# Patient Record
Sex: FEMALE | Race: WHITE | NOT HISPANIC OR LATINO | URBAN - METROPOLITAN AREA
[De-identification: names, ages, dates, MRNs, and addresses within clinical notes are randomized per-mention and may not be internally consistent; named-entity substitution may affect disease eponyms.]

---

## 2022-01-12 ENCOUNTER — EMERGENCY (EMERGENCY)
Facility: HOSPITAL | Age: 27
LOS: 1 days | Discharge: HOME | End: 2022-01-12
Attending: STUDENT IN AN ORGANIZED HEALTH CARE EDUCATION/TRAINING PROGRAM
Payer: COMMERCIAL

## 2022-01-12 VITALS
RESPIRATION RATE: 18 BRPM | WEIGHT: 139.99 LBS | DIASTOLIC BLOOD PRESSURE: 77 MMHG | SYSTOLIC BLOOD PRESSURE: 123 MMHG | TEMPERATURE: 99 F | HEART RATE: 95 BPM | HEIGHT: 64 IN | OXYGEN SATURATION: 96 %

## 2022-01-12 LAB
ALBUMIN SERPL ELPH-MCNC: 3.3 G/DL — LOW (ref 3.5–5)
ALP SERPL-CCNC: 76 U/L — SIGNIFICANT CHANGE UP (ref 40–120)
ALT FLD-CCNC: 85 U/L DA — HIGH (ref 10–60)
ANION GAP SERPL CALC-SCNC: 6 MMOL/L — SIGNIFICANT CHANGE UP (ref 5–17)
ANISOCYTOSIS BLD QL: SLIGHT — SIGNIFICANT CHANGE UP
AST SERPL-CCNC: 24 U/L — SIGNIFICANT CHANGE UP (ref 10–40)
BASOPHILS # BLD AUTO: 0.07 K/UL — SIGNIFICANT CHANGE UP (ref 0–0.2)
BASOPHILS NFR BLD AUTO: 1 % — SIGNIFICANT CHANGE UP (ref 0–2)
BILIRUB SERPL-MCNC: 0.2 MG/DL — SIGNIFICANT CHANGE UP (ref 0.2–1.2)
BUN SERPL-MCNC: 9 MG/DL — SIGNIFICANT CHANGE UP (ref 7–18)
CALCIUM SERPL-MCNC: 8.9 MG/DL — SIGNIFICANT CHANGE UP (ref 8.4–10.5)
CHLORIDE SERPL-SCNC: 105 MMOL/L — SIGNIFICANT CHANGE UP (ref 96–108)
CO2 SERPL-SCNC: 30 MMOL/L — SIGNIFICANT CHANGE UP (ref 22–31)
CREAT SERPL-MCNC: 0.8 MG/DL — SIGNIFICANT CHANGE UP (ref 0.5–1.3)
EOSINOPHIL # BLD AUTO: 0.14 K/UL — SIGNIFICANT CHANGE UP (ref 0–0.5)
EOSINOPHIL NFR BLD AUTO: 2 % — SIGNIFICANT CHANGE UP (ref 0–6)
GLUCOSE SERPL-MCNC: 108 MG/DL — HIGH (ref 70–99)
HCG SERPL-ACNC: <1 MIU/ML — SIGNIFICANT CHANGE UP
HCT VFR BLD CALC: 35.5 % — SIGNIFICANT CHANGE UP (ref 34.5–45)
HGB BLD-MCNC: 11.7 G/DL — SIGNIFICANT CHANGE UP (ref 11.5–15.5)
HYPOCHROMIA BLD QL: SLIGHT — SIGNIFICANT CHANGE UP
LG PLATELETS BLD QL AUTO: SLIGHT — SIGNIFICANT CHANGE UP
LYMPHOCYTES # BLD AUTO: 0.88 K/UL — LOW (ref 1–3.3)
LYMPHOCYTES # BLD AUTO: 13 % — SIGNIFICANT CHANGE UP (ref 13–44)
MACROCYTES BLD QL: SLIGHT — SIGNIFICANT CHANGE UP
MAGNESIUM SERPL-MCNC: 2.1 MG/DL — SIGNIFICANT CHANGE UP (ref 1.6–2.6)
MANUAL SMEAR VERIFICATION: SIGNIFICANT CHANGE UP
MCHC RBC-ENTMCNC: 29.5 PG — SIGNIFICANT CHANGE UP (ref 27–34)
MCHC RBC-ENTMCNC: 33 GM/DL — SIGNIFICANT CHANGE UP (ref 32–36)
MCV RBC AUTO: 89.6 FL — SIGNIFICANT CHANGE UP (ref 80–100)
MICROCYTES BLD QL: SLIGHT — SIGNIFICANT CHANGE UP
MONOCYTES # BLD AUTO: 1.09 K/UL — HIGH (ref 0–0.9)
MONOCYTES NFR BLD AUTO: 16 % — HIGH (ref 2–14)
NEUTROPHILS # BLD AUTO: 4.62 K/UL — SIGNIFICANT CHANGE UP (ref 1.8–7.4)
NEUTROPHILS NFR BLD AUTO: 68 % — SIGNIFICANT CHANGE UP (ref 43–77)
NRBC # BLD: 0 /100 — SIGNIFICANT CHANGE UP (ref 0–0)
PHOSPHATE SERPL-MCNC: 3.9 MG/DL — SIGNIFICANT CHANGE UP (ref 2.5–4.5)
PLAT MORPH BLD: NORMAL — SIGNIFICANT CHANGE UP
PLATELET # BLD AUTO: 209 K/UL — SIGNIFICANT CHANGE UP (ref 150–400)
POIKILOCYTOSIS BLD QL AUTO: SLIGHT — SIGNIFICANT CHANGE UP
POLYCHROMASIA BLD QL SMEAR: SLIGHT — SIGNIFICANT CHANGE UP
POTASSIUM SERPL-MCNC: 3.6 MMOL/L — SIGNIFICANT CHANGE UP (ref 3.5–5.3)
POTASSIUM SERPL-SCNC: 3.6 MMOL/L — SIGNIFICANT CHANGE UP (ref 3.5–5.3)
PROT SERPL-MCNC: 7.2 G/DL — SIGNIFICANT CHANGE UP (ref 6–8.3)
RBC # BLD: 3.96 M/UL — SIGNIFICANT CHANGE UP (ref 3.8–5.2)
RBC # FLD: 12.4 % — SIGNIFICANT CHANGE UP (ref 10.3–14.5)
RBC BLD AUTO: ABNORMAL
SARS-COV-2 RNA SPEC QL NAA+PROBE: DETECTED
SODIUM SERPL-SCNC: 141 MMOL/L — SIGNIFICANT CHANGE UP (ref 135–145)
WBC # BLD: 6.8 K/UL — SIGNIFICANT CHANGE UP (ref 3.8–10.5)
WBC # FLD AUTO: 6.8 K/UL — SIGNIFICANT CHANGE UP (ref 3.8–10.5)

## 2022-01-12 PROCEDURE — 84702 CHORIONIC GONADOTROPIN TEST: CPT

## 2022-01-12 PROCEDURE — 96375 TX/PRO/DX INJ NEW DRUG ADDON: CPT

## 2022-01-12 PROCEDURE — 99285 EMERGENCY DEPT VISIT HI MDM: CPT | Mod: 25

## 2022-01-12 PROCEDURE — 71046 X-RAY EXAM CHEST 2 VIEWS: CPT | Mod: 26

## 2022-01-12 PROCEDURE — 36415 COLL VENOUS BLD VENIPUNCTURE: CPT

## 2022-01-12 PROCEDURE — 83735 ASSAY OF MAGNESIUM: CPT

## 2022-01-12 PROCEDURE — 71046 X-RAY EXAM CHEST 2 VIEWS: CPT

## 2022-01-12 PROCEDURE — 85025 COMPLETE CBC W/AUTO DIFF WBC: CPT

## 2022-01-12 PROCEDURE — 87635 SARS-COV-2 COVID-19 AMP PRB: CPT

## 2022-01-12 PROCEDURE — 96374 THER/PROPH/DIAG INJ IV PUSH: CPT

## 2022-01-12 PROCEDURE — 93005 ELECTROCARDIOGRAM TRACING: CPT

## 2022-01-12 PROCEDURE — 99284 EMERGENCY DEPT VISIT MOD MDM: CPT

## 2022-01-12 PROCEDURE — 84100 ASSAY OF PHOSPHORUS: CPT

## 2022-01-12 PROCEDURE — 96361 HYDRATE IV INFUSION ADD-ON: CPT

## 2022-01-12 PROCEDURE — 80053 COMPREHEN METABOLIC PANEL: CPT

## 2022-01-12 RX ORDER — KETOROLAC TROMETHAMINE 30 MG/ML
30 SYRINGE (ML) INJECTION ONCE
Refills: 0 | Status: DISCONTINUED | OUTPATIENT
Start: 2022-01-12 | End: 2022-01-12

## 2022-01-12 RX ORDER — SODIUM CHLORIDE 9 MG/ML
1000 INJECTION INTRAMUSCULAR; INTRAVENOUS; SUBCUTANEOUS ONCE
Refills: 0 | Status: COMPLETED | OUTPATIENT
Start: 2022-01-12 | End: 2022-01-12

## 2022-01-12 RX ORDER — METOCLOPRAMIDE HCL 10 MG
10 TABLET ORAL ONCE
Refills: 0 | Status: COMPLETED | OUTPATIENT
Start: 2022-01-12 | End: 2022-01-12

## 2022-01-12 RX ADMIN — SODIUM CHLORIDE 1000 MILLILITER(S): 9 INJECTION INTRAMUSCULAR; INTRAVENOUS; SUBCUTANEOUS at 21:05

## 2022-01-12 RX ADMIN — Medication 30 MILLIGRAM(S): at 19:13

## 2022-01-12 RX ADMIN — Medication 1 CAPSULE(S): at 21:31

## 2022-01-12 RX ADMIN — SODIUM CHLORIDE 1000 MILLILITER(S): 9 INJECTION INTRAMUSCULAR; INTRAVENOUS; SUBCUTANEOUS at 19:13

## 2022-01-12 RX ADMIN — Medication 10 MILLIGRAM(S): at 19:13

## 2022-01-12 RX ADMIN — Medication 1 CAPSULE(S): at 20:54

## 2022-01-12 RX ADMIN — Medication 30 MILLIGRAM(S): at 19:43

## 2022-01-12 NOTE — ED PROVIDER NOTE - OBJECTIVE STATEMENT
26F, no significant pmh presenting with 1 day of chills, nausea, cough, body aches and chest pain. no vomiting, diarrhea or abdominal pain. no pain or burning with urination. no known sick contacts.

## 2022-01-12 NOTE — ED PROVIDER NOTE - CLINICAL SUMMARY MEDICAL DECISION MAKING FREE TEXT BOX
26F presenting with headache and body aches. no nuchal rigidity, normal gait. likely viral illness. will get labs, cxr, ekg. will reassess.

## 2022-01-12 NOTE — ED PROVIDER NOTE - PATIENT PORTAL LINK FT
You can access the FollowMyHealth Patient Portal offered by [facility] by registering at the following website: [email or web address]. By joining [employer]’s FollowMyHealth portal, you will also be able to view your health information using other applications (apps) compatible with our system.

## 2022-01-12 NOTE — ED ADULT NURSE NOTE - NSIMPLEMENTINTERV_GEN_ALL_ED
Implemented All Universal Safety Interventions:  [place] to call system. Call bell, personal items and telephone within reach. Instruct patient to call for assistance. Room bathroom lighting operational. Non-slip footwear when patient is off stretcher. Physically safe environment: no spills, clutter or unnecessary equipment. Stretcher in lowest position, wheels locked, appropriate side rails in place.

## 2022-01-12 NOTE — ED PROVIDER NOTE - NSFOLLOWUPINSTRUCTIONS_ED_ALL_ED_FT
You were seen in the emergency department for headache and body aches likely caused by a viral illness.     Please follow-up with your primary care doctor in the next 24-48 hours.     Please take Ibuprofen and Tylenol as prescribed on the bottles for symptom control.     If you have any worsening symptoms, severe headache, chest pain or shortness of breath, please return to the emergency department. You were seen in the emergency department for headache and body aches and were found to have Covid-19.    Please remain in quarantine or the next 5 days and inform any close contacts that you have tested positive.     Please take Ibuprofen and Tylenol as prescribed on the bottles for symptom control.     If you have any worsening symptoms, severe headache, chest pain or shortness of breath, or your oxygen level is below 92% please return to the emergency department.

## 2022-04-15 ENCOUNTER — EMERGENCY (EMERGENCY)
Facility: HOSPITAL | Age: 27
LOS: 1 days | Discharge: HOME | End: 2022-04-15
Attending: EMERGENCY MEDICINE
Payer: COMMERCIAL

## 2022-04-15 VITALS
HEART RATE: 84 BPM | TEMPERATURE: 98 F | HEIGHT: 64 IN | DIASTOLIC BLOOD PRESSURE: 72 MMHG | SYSTOLIC BLOOD PRESSURE: 102 MMHG | RESPIRATION RATE: 16 BRPM | OXYGEN SATURATION: 98 % | WEIGHT: 130.07 LBS

## 2022-04-15 LAB
ALBUMIN SERPL ELPH-MCNC: 3.7 G/DL — SIGNIFICANT CHANGE UP (ref 3.5–5)
ALP SERPL-CCNC: 67 U/L — SIGNIFICANT CHANGE UP (ref 40–120)
ALT FLD-CCNC: 25 U/L DA — SIGNIFICANT CHANGE UP (ref 10–60)
ANION GAP SERPL CALC-SCNC: 4 MMOL/L — LOW (ref 5–17)
AST SERPL-CCNC: 18 U/L — SIGNIFICANT CHANGE UP (ref 10–40)
BASOPHILS # BLD AUTO: 0.04 K/UL — SIGNIFICANT CHANGE UP (ref 0–0.2)
BASOPHILS NFR BLD AUTO: 0.5 % — SIGNIFICANT CHANGE UP (ref 0–2)
BILIRUB SERPL-MCNC: 0.2 MG/DL — SIGNIFICANT CHANGE UP (ref 0.2–1.2)
BUN SERPL-MCNC: 9 MG/DL — SIGNIFICANT CHANGE UP (ref 7–18)
CALCIUM SERPL-MCNC: 9.3 MG/DL — SIGNIFICANT CHANGE UP (ref 8.4–10.5)
CHLORIDE SERPL-SCNC: 108 MMOL/L — SIGNIFICANT CHANGE UP (ref 96–108)
CO2 SERPL-SCNC: 26 MMOL/L — SIGNIFICANT CHANGE UP (ref 22–31)
CREAT SERPL-MCNC: 0.6 MG/DL — SIGNIFICANT CHANGE UP (ref 0.5–1.3)
EGFR: 125 ML/MIN/1.73M2 — SIGNIFICANT CHANGE UP
EOSINOPHIL # BLD AUTO: 0.35 K/UL — SIGNIFICANT CHANGE UP (ref 0–0.5)
EOSINOPHIL NFR BLD AUTO: 4.4 % — SIGNIFICANT CHANGE UP (ref 0–6)
FLUAV AG NPH QL: SIGNIFICANT CHANGE UP
FLUBV AG NPH QL: SIGNIFICANT CHANGE UP
GLUCOSE SERPL-MCNC: 86 MG/DL — SIGNIFICANT CHANGE UP (ref 70–99)
HCG SERPL-ACNC: <1 MIU/ML — SIGNIFICANT CHANGE UP
HCT VFR BLD CALC: 38 % — SIGNIFICANT CHANGE UP (ref 34.5–45)
HGB BLD-MCNC: 12.8 G/DL — SIGNIFICANT CHANGE UP (ref 11.5–15.5)
IMM GRANULOCYTES NFR BLD AUTO: 0.3 % — SIGNIFICANT CHANGE UP (ref 0–1.5)
LYMPHOCYTES # BLD AUTO: 1.72 K/UL — SIGNIFICANT CHANGE UP (ref 1–3.3)
LYMPHOCYTES # BLD AUTO: 21.8 % — SIGNIFICANT CHANGE UP (ref 13–44)
MAGNESIUM SERPL-MCNC: 2 MG/DL — SIGNIFICANT CHANGE UP (ref 1.6–2.6)
MCHC RBC-ENTMCNC: 30.7 PG — SIGNIFICANT CHANGE UP (ref 27–34)
MCHC RBC-ENTMCNC: 33.7 GM/DL — SIGNIFICANT CHANGE UP (ref 32–36)
MCV RBC AUTO: 91.1 FL — SIGNIFICANT CHANGE UP (ref 80–100)
MONOCYTES # BLD AUTO: 1.2 K/UL — HIGH (ref 0–0.9)
MONOCYTES NFR BLD AUTO: 15.2 % — HIGH (ref 2–14)
NEUTROPHILS # BLD AUTO: 4.56 K/UL — SIGNIFICANT CHANGE UP (ref 1.8–7.4)
NEUTROPHILS NFR BLD AUTO: 57.8 % — SIGNIFICANT CHANGE UP (ref 43–77)
NRBC # BLD: 0 /100 WBCS — SIGNIFICANT CHANGE UP (ref 0–0)
PLATELET # BLD AUTO: 250 K/UL — SIGNIFICANT CHANGE UP (ref 150–400)
POTASSIUM SERPL-MCNC: 4.1 MMOL/L — SIGNIFICANT CHANGE UP (ref 3.5–5.3)
POTASSIUM SERPL-SCNC: 4.1 MMOL/L — SIGNIFICANT CHANGE UP (ref 3.5–5.3)
PROT SERPL-MCNC: 8 G/DL — SIGNIFICANT CHANGE UP (ref 6–8.3)
RBC # BLD: 4.17 M/UL — SIGNIFICANT CHANGE UP (ref 3.8–5.2)
RBC # FLD: 12.4 % — SIGNIFICANT CHANGE UP (ref 10.3–14.5)
SARS-COV-2 RNA SPEC QL NAA+PROBE: SIGNIFICANT CHANGE UP
SODIUM SERPL-SCNC: 138 MMOL/L — SIGNIFICANT CHANGE UP (ref 135–145)
WBC # BLD: 7.89 K/UL — SIGNIFICANT CHANGE UP (ref 3.8–10.5)
WBC # FLD AUTO: 7.89 K/UL — SIGNIFICANT CHANGE UP (ref 3.8–10.5)

## 2022-04-15 PROCEDURE — 83735 ASSAY OF MAGNESIUM: CPT

## 2022-04-15 PROCEDURE — 85025 COMPLETE CBC W/AUTO DIFF WBC: CPT

## 2022-04-15 PROCEDURE — 96375 TX/PRO/DX INJ NEW DRUG ADDON: CPT

## 2022-04-15 PROCEDURE — 84702 CHORIONIC GONADOTROPIN TEST: CPT

## 2022-04-15 PROCEDURE — 80053 COMPREHEN METABOLIC PANEL: CPT

## 2022-04-15 PROCEDURE — 99284 EMERGENCY DEPT VISIT MOD MDM: CPT | Mod: 25

## 2022-04-15 PROCEDURE — 99283 EMERGENCY DEPT VISIT LOW MDM: CPT

## 2022-04-15 PROCEDURE — 96374 THER/PROPH/DIAG INJ IV PUSH: CPT

## 2022-04-15 PROCEDURE — 87637 SARSCOV2&INF A&B&RSV AMP PRB: CPT

## 2022-04-15 PROCEDURE — 36415 COLL VENOUS BLD VENIPUNCTURE: CPT

## 2022-04-15 RX ORDER — SODIUM CHLORIDE 9 MG/ML
1000 INJECTION INTRAMUSCULAR; INTRAVENOUS; SUBCUTANEOUS ONCE
Refills: 0 | Status: COMPLETED | OUTPATIENT
Start: 2022-04-15 | End: 2022-04-15

## 2022-04-15 RX ORDER — KETOROLAC TROMETHAMINE 30 MG/ML
30 SYRINGE (ML) INJECTION ONCE
Refills: 0 | Status: DISCONTINUED | OUTPATIENT
Start: 2022-04-15 | End: 2022-04-15

## 2022-04-15 RX ORDER — DIPHENHYDRAMINE HCL 50 MG
25 CAPSULE ORAL ONCE
Refills: 0 | Status: COMPLETED | OUTPATIENT
Start: 2022-04-15 | End: 2022-04-15

## 2022-04-15 RX ORDER — METOCLOPRAMIDE HCL 10 MG
10 TABLET ORAL ONCE
Refills: 0 | Status: COMPLETED | OUTPATIENT
Start: 2022-04-15 | End: 2022-04-15

## 2022-04-15 RX ADMIN — Medication 30 MILLIGRAM(S): at 03:27

## 2022-04-15 RX ADMIN — Medication 10 MILLIGRAM(S): at 03:26

## 2022-04-15 RX ADMIN — Medication 25 MILLIGRAM(S): at 03:27

## 2022-04-15 RX ADMIN — SODIUM CHLORIDE 1000 MILLILITER(S): 9 INJECTION INTRAMUSCULAR; INTRAVENOUS; SUBCUTANEOUS at 03:26

## 2022-04-15 NOTE — ED PROVIDER NOTE - PROGRESS NOTE DETAILS
ha resolved. feels improved. labs are unremarkable. flu/covid negative. will dc. f/u with PMD. return precautions discussed.

## 2022-04-15 NOTE — ED ADULT NURSE NOTE - NSIMPLEMENTINTERV_GEN_ALL_ED
Implemented All Universal Safety Interventions:  [place] to call system. Call bell, personal items and telephone within reach. Instruct patient to call for assistance. Room bathroom lighting operational. Non-slip footwear when patient is off stretcher. Physically safe environment: no spills, clutter or unnecessary equipment. Stretcher in lowest position, wheels locked, appropriate side rails in place.

## 2022-04-15 NOTE — ED PROVIDER NOTE - NSFOLLOWUPINSTRUCTIONS_ED_ALL_ED_FT
[facility]                                                                                                                                                                     General Headache Without Cause      A headache is pain or discomfort felt around the head or neck area. The specific cause of a headache may not be found. There are many causes and types of headaches. A few common ones are:  •Tension headaches.      •Migraine headaches.      •Cluster headaches.      •Chronic daily headaches.        Follow these instructions at home:    Watch your condition for any changes. Let your health care provider know about them. Take these steps to help with your condition:      Managing pain                   •Take over-the-counter and prescription medicines only as told by your health care provider.      •Lie down in a dark, quiet room when you have a headache.    •If directed, put ice on your head and neck area:  •Put ice in a plastic bag.      •Place a towel between your skin and the bag.      •Leave the ice on for 20 minutes, 2–3 times per day.      •If directed, apply heat to the affected area. Use the heat source that your health care provider recommends, such as a moist heat pack or a heating pad.  •Place a towel between your skin and the heat source.      •Leave the heat on for 20–30 minutes.      •Remove the heat if your skin turns bright red. This is especially important if you are unable to feel pain, heat, or cold. You may have a greater risk of getting burned.        •Keep lights dim if bright lights bother you or make your headaches worse.      Eating and drinking     •Eat meals on a regular schedule.    •If you drink alcohol:•Limit how much you use to:   •0–1 drink a day for women.       • 0–2 drinks a day for men.         •Be aware of how much alcohol is in your drink. In the [place] one drink equals one 12 oz bottle of beer (355 mL), one 5 oz glass of wine (148 mL), or one 1½ oz glass of hard liquor (44 mL).        •Stop drinking caffeine, or decrease the amount of caffeine you drink.        General instructions    •Keep a headache journal to help find out what may trigger your headaches. For example, write down:  •What you eat and drink.      •How much sleep you get.      •Any change to your diet or medicines.        •Try massage or other relaxation techniques.      •Limit stress.      •Sit up straight, and do not tense your muscles.      • Do not use any products that contain nicotine or tobacco, such as cigarettes, e-cigarettes, and chewing tobacco. If you need help quitting, ask your health care provider.      •Exercise regularly as told by your health care provider.      •Sleep on a regular schedule. Get 7–9 hours of sleep each night, or the amount recommended by your health care provider.      •Keep all follow-up visits as told by your health care provider. This is important.        Contact a health care provider if:    •Your symptoms are not helped by medicine.      •You have a headache that is different from the usual headache.      •You have nausea or you vomit.      •You have a fever.        Get help right away if:    •Your headache becomes severe quickly.      •Your headache gets worse after moderate to intense physical activity.      •You have repeated vomiting.      •You have a stiff neck.      •You have a loss of vision.      •You have problems with speech.      •You have pain in the eye or ear.      •You have muscular weakness or loss of muscle control.      •You lose your balance or have trouble walking.      •You feel faint or pass out.      •You have confusion.      •You have a seizure.        Summary    •A headache is pain or discomfort felt around the head or neck area.      •There are many causes and types of headaches. In some cases, the cause may not be found.      •Keep a headache journal to help find out what may trigger your headaches. Watch your condition for any changes. Let your health care provider know about them.      •Contact a health care provider if you have a headache that is different from the usual headache, or if your symptoms are not helped by medicine.      •Get help right away if your headache becomes severe, you vomit, you have a loss of vision, you lose your balance, or you have a seizure.      This information is not intended to replace advice given to you by your health care provider. Make sure you discuss any questions you have with your health care provider.      Document Revised: 07/08/2019 Document Reviewed: 07/08/2019    Elsevier Patient Education © 2022 [employer]

## 2022-04-15 NOTE — ED PROVIDER NOTE - PATIENT PORTAL LINK FT
You can access the FollowMyHealth Patient Portal offered by [facility] by registering at the following website: [email or web address]. By joining [employer]’s FollowMyHealth portal, you will also be able to view your health information using other applications (apps) compatible with our system.

## 2022-04-15 NOTE — ED PROVIDER NOTE - CLINICAL SUMMARY MEDICAL DECISION MAKING FREE TEXT BOX
27 year old female with HA/sore throat/body aches. PE as above.  labs, migraine cocktail, flu/covid swab, reassess

## 2022-04-15 NOTE — ED PROVIDER NOTE - OBJECTIVE STATEMENT
27 year old female denies PMH coming in with sore/itchy throat, HA, body aches. works as - concerned for covid and would also like covid test. denies fevers, chills, sweats, cp, sob, palpitations, abd pains, N/V/D/C, urinary complaints.

## 2024-07-16 ENCOUNTER — APPOINTMENT (OUTPATIENT)
Dept: ORTHOPEDIC SURGERY | Facility: CLINIC | Age: 29
End: 2024-07-16

## 2024-07-23 PROBLEM — Z00.00 ENCOUNTER FOR PREVENTIVE HEALTH EXAMINATION: Status: ACTIVE | Noted: 2024-07-23

## 2024-07-26 ENCOUNTER — APPOINTMENT (OUTPATIENT)
Dept: ORTHOPEDIC SURGERY | Facility: CLINIC | Age: 29
End: 2024-07-26
Payer: OTHER MISCELLANEOUS

## 2024-07-26 DIAGNOSIS — M25.511 PAIN IN RIGHT SHOULDER: ICD-10-CM

## 2024-07-26 DIAGNOSIS — S46.011A STRAIN OF MUSCLE(S) AND TENDON(S) OF THE ROTATOR CUFF OF RIGHT SHOULDER, INITIAL ENCOUNTER: ICD-10-CM

## 2024-07-26 DIAGNOSIS — Z78.9 OTHER SPECIFIED HEALTH STATUS: ICD-10-CM

## 2024-07-26 DIAGNOSIS — M54.6 PAIN IN THORACIC SPINE: ICD-10-CM

## 2024-07-26 DIAGNOSIS — M23.92 UNSPECIFIED INTERNAL DERANGEMENT OF LEFT KNEE: ICD-10-CM

## 2024-07-26 DIAGNOSIS — M25.562 PAIN IN LEFT KNEE: ICD-10-CM

## 2024-07-26 DIAGNOSIS — M54.9 DORSALGIA, UNSPECIFIED: ICD-10-CM

## 2024-07-26 PROCEDURE — 73010 X-RAY EXAM OF SHOULDER BLADE: CPT | Mod: RT

## 2024-07-26 PROCEDURE — 99204 OFFICE O/P NEW MOD 45 MIN: CPT

## 2024-07-26 PROCEDURE — 73030 X-RAY EXAM OF SHOULDER: CPT | Mod: RT

## 2024-07-26 PROCEDURE — 72070 X-RAY EXAM THORAC SPINE 2VWS: CPT

## 2024-07-26 PROCEDURE — 73562 X-RAY EXAM OF KNEE 3: CPT | Mod: LT
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
## 2024-07-26 NOTE — WORK
[Crush Injury] : crush injury [Sprain/Strain] : sprain/strain [Was the competent medical cause of the injury] : was the competent medical cause of the injury [Are consistent with the injury] : are consistent with the injury [Consistent with my objective findings] : consistent with my objective findings [Partial] : partial [Does not reveal pre-existing condition(s) that may affect treatment/prognosis] : does not reveal pre-existing condition(s) that may affect treatment/prognosis [Cannot return to work because ________] : cannot return to work because [unfilled] [Unknown at this time] : : unknown at this time [Patient] : patient [No Rx restrictions] : No Rx restrictions. [I provided the services listed above] :  I provided the services listed above. [FreeTextEntry1] : Good
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
## 2024-07-26 NOTE — IMAGING
[de-identified] : Neck: - No obvious deformity, swelling, or bruising - Pain to bilateral trapezius, right greater than left - No pain with palpation of spinous processes - ROM intact throughout flexion, extension, side bending, and rotation.  Pain with forward flexion.  Improved with extension.  - 5/5 strength throughout UE evaluation bilaterally - Negative Spurling Maneuver - Distally neurovascularly intact  L shoulder: - No obvious deformity or swelling - No pain with palpation over AC joint, anterior or posterior shoulder - 180 degrees, External rotation to 30 degrees, Internal rotation to T12 - 5/5 strength with internal and external rotation - Negative Empty can - Negative Speeds - Distally neurovascularly intact    R shoulder: - No obvious deformity or swelling - No pain with palpation over AC joint, anterior or posterior shoulder - Forward flexion to 180 degrees, External rotation to 30 degrees, Internal rotation to T12.  Pain at max range of motion throughout  - 5/5 strength with internal and external rotation.  Pain with external rotation -Mild weakness and pain with empty can -Pain with impingement testing -Pain with speeds/ - Pain with Bell's - Distally neurovascularly intact  Back: - No obvious deformity - Pain with palpation over the T10 spinous process - No pain with palpation of paraspinal musculature - ROM intact throughout flexion, extension, sidebending, and rotation.  Pain with sidebending right - 5/5 strength throughout LE evaluation bilaterally - No pain with ESTEPHANIA - Negative straight leg raise bilaterally - Distally neurovascularly intact  L knee: - No obvious deformity or swelling - Medial joint line pain - No pain with palpation of lateral joint line. - ROM from 135 degrees of flexion to 0 degrees extension.  States pain with maximum flexion - 5/5 strength with knee flexion and extension - Stable varus, valgus, Lachman's, posterior drawer testing -Pain with Alejandra's -Pain with patellar grind - Distally neurovascularly intact.     R knee: - No obvious deformity or swelling - No pain with palpation of medial or lateral joint line. - ROM from 135 degrees of flexion to 0 degrees extension. - 5/5 strength with knee flexion and extension - Stable varus, valgus, Lachman's, posterior drawer testing - Distally neurovascularly intact. [N/A] : thoracic [No bony abnormalities] : No bony abnormalities [Right] : right shoulder [Left] : left knee [There are no fractures, subluxations or dislocations. No significant abnormalities are seen] : There are no fractures, subluxations or dislocations. No significant abnormalities are seen

## 2024-08-07 ENCOUNTER — APPOINTMENT (OUTPATIENT)
Dept: ORTHOPEDIC SURGERY | Facility: CLINIC | Age: 29
End: 2024-08-07

## 2024-08-07 PROBLEM — M67.911 TENDINOPATHY OF RIGHT ROTATOR CUFF: Status: ACTIVE | Noted: 2024-08-07

## 2024-08-07 PROBLEM — M75.51 SUBACROMIAL BURSITIS OF RIGHT SHOULDER JOINT: Status: ACTIVE | Noted: 2024-08-07

## 2024-08-07 PROBLEM — M79.18 MYOFASCIAL PAIN SYNDROME OF THORACIC SPINE: Status: ACTIVE | Noted: 2024-08-07

## 2024-08-07 PROBLEM — S23.9XXA THORACIC SPRAIN: Status: ACTIVE | Noted: 2024-08-07

## 2024-08-07 PROCEDURE — 99213 OFFICE O/P EST LOW 20 MIN: CPT
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
## 2024-08-07 NOTE — IMAGING
[N/A] : thoracic [No bony abnormalities] : No bony abnormalities [Right] : right shoulder [Left] : left knee [There are no fractures, subluxations or dislocations. No significant abnormalities are seen] : There are no fractures, subluxations or dislocations. No significant abnormalities are seen [de-identified] : Neck: - No obvious deformity, swelling, or bruising - Pain to bilateral trapezius, right greater than left - No pain with palpation of spinous processes - ROM intact throughout flexion, extension, side bending, and rotation.  Pain with forward flexion.  Improved with extension.  - 5/5 strength throughout UE evaluation bilaterally - Negative Spurling Maneuver - Distally neurovascularly intact  L shoulder: - No obvious deformity or swelling - No pain with palpation over AC joint, anterior or posterior shoulder - 180 degrees, External rotation to 30 degrees, Internal rotation to T12 - 5/5 strength with internal and external rotation - Negative Empty can - Negative Speeds - Distally neurovascularly intact    R shoulder: - No obvious deformity or swelling - No pain with palpation over AC joint, anterior or posterior shoulder - Forward flexion to 180 degrees, External rotation to 30 degrees, Internal rotation to T12.  No pain at max range of motion throughout  - 5/5 strength with internal and external rotation.  No pain with external rotation -No weakness and pain with empty can -No pain with impingement testing -No pain with speeds/ -No pain with Bell's - Distally neurovascularly intact  Back: - No obvious deformity - Pain with palpation over the T10 spinous process - No pain with palpation of paraspinal musculature - ROM intact throughout flexion, extension, sidebending, and rotation.  Pain with sidebending right - 5/5 strength throughout LE evaluation bilaterally - No pain with ESTEPHANIA - Negative straight leg raise bilaterally - Distally neurovascularly intact  L knee: - No obvious deformity or swelling -No medial joint line pain - No pain with palpation of lateral joint line. - ROM from 135 degrees of flexion to 0 degrees extension.   - 5/5 strength with knee flexion and extension - Stable varus, valgus, Lachman's, posterior drawer testing -No pain with Alejandra's -No pain with patellar grind - Distally neurovascularly intact.     R knee: - No obvious deformity or swelling - No pain with palpation of medial or lateral joint line. - ROM from 135 degrees of flexion to 0 degrees extension. - 5/5 strength with knee flexion and extension - Stable varus, valgus, Lachman's, posterior drawer testing - Distally neurovascularly intact.
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
## 2024-08-07 NOTE — WORK
[Crush Injury] : crush injury [Sprain/Strain] : sprain/strain [Was the competent medical cause of the injury] : was the competent medical cause of the injury [Are consistent with the injury] : are consistent with the injury [Consistent with my objective findings] : consistent with my objective findings [Partial] : partial [Does not reveal pre-existing condition(s) that may affect treatment/prognosis] : does not reveal pre-existing condition(s) that may affect treatment/prognosis [Unknown at this time] : : unknown at this time [Patient] : patient [No Rx restrictions] : No Rx restrictions. [I provided the services listed above] :  I provided the services listed above. [Can return to work with limitations on ______] : can return to work with limitations on [unfilled] [Bending/Twisting] : bending/twisting [Climbing stairs/Ladders] : climbing stairs/ladders [Lifting] : lifting [Reaching overhead] : reaching overhead [No] : No [FreeTextEntry1] : Good

## 2024-08-13 ENCOUNTER — APPOINTMENT (OUTPATIENT)
Dept: ORTHOPEDIC SURGERY | Facility: CLINIC | Age: 29
End: 2024-08-13
Payer: OTHER MISCELLANEOUS

## 2024-08-13 DIAGNOSIS — M75.51 BURSITIS OF RIGHT SHOULDER: ICD-10-CM

## 2024-08-13 DIAGNOSIS — M79.18 MYALGIA, OTHER SITE: ICD-10-CM

## 2024-08-13 DIAGNOSIS — S23.9XXA SPRAIN OF UNSPECIFIED PARTS OF THORAX, INITIAL ENCOUNTER: ICD-10-CM

## 2024-08-13 DIAGNOSIS — M67.911 UNSPECIFIED DISORDER OF SYNOVIUM AND TENDON, RIGHT SHOULDER: ICD-10-CM

## 2024-08-13 DIAGNOSIS — M25.562 PAIN IN LEFT KNEE: ICD-10-CM

## 2024-08-13 PROCEDURE — 99213 OFFICE O/P EST LOW 20 MIN: CPT

## 2024-08-13 NOTE — WORK
[Crush Injury] : crush injury [Sprain/Strain] : sprain/strain [Was the competent medical cause of the injury] : was the competent medical cause of the injury [Are consistent with the injury] : are consistent with the injury [Consistent with my objective findings] : consistent with my objective findings [Partial] : partial [Does not reveal pre-existing condition(s) that may affect treatment/prognosis] : does not reveal pre-existing condition(s) that may affect treatment/prognosis [Can return to work with limitations on ______] : can return to work with limitations on [unfilled] [Bending/Twisting] : bending/twisting [Climbing stairs/Ladders] : climbing stairs/ladders [Lifting] : lifting [Reaching overhead] : reaching overhead [Unknown at this time] : : unknown at this time [Patient] : patient [No] : No [No Rx restrictions] : No Rx restrictions. [I provided the services listed above] :  I provided the services listed above. [FreeTextEntry1] : Good [Light Work:] : Light Work: Exerting up to 20 pounds of force occasionally, and/or up to 10 pounds of force frequently and/or negligible amount of force constantly to move objects. Physical demand requirements are in excess of those for Sedentary Work. Even though the weight lifted may only be a negligible amount, a job should be rated Light Work: (1) when it requires walking or standing to a significant degree: or (2) when it requires sitting most of the time but entails pushing and/or pulling of arm or leg controls: and/or (3) when the job requires working at a production rate pace entailing the constant pushing and/or pulling of materials even though the weight of those materials is negligible. NOTE: The constant stress of maintaining a production rate pace, especially in an industrial setting, can be and is physically demanding of a worker even though the amount of force exerted is negligible.
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
## 2024-08-13 NOTE — IMAGING
[N/A] : thoracic [No bony abnormalities] : No bony abnormalities [Right] : right shoulder [Left] : left knee [There are no fractures, subluxations or dislocations. No significant abnormalities are seen] : There are no fractures, subluxations or dislocations. No significant abnormalities are seen [de-identified] : Neck: - No obvious deformity, swelling, or bruising - Pain to bilateral trapezius, right greater than left - No pain with palpation of spinous processes - ROM intact throughout flexion, extension, side bending, and rotation.  Pain with forward flexion.  Improved with extension.  - 5/5 strength throughout UE evaluation bilaterally - Negative Spurling Maneuver - Distally neurovascularly intact  L shoulder: - No obvious deformity or swelling - No pain with palpation over AC joint, anterior or posterior shoulder - 180 degrees, External rotation to 30 degrees, Internal rotation to T12 - 5/5 strength with internal and external rotation - Negative Empty can - Negative Speeds - Distally neurovascularly intact    R shoulder: - No obvious deformity or swelling - No pain with palpation over AC joint, anterior or posterior shoulder - Forward flexion to 180 degrees, External rotation to 30 degrees, Internal rotation to T12.  No pain at max range of motion throughout  - 5/5 strength with internal and external rotation.  No pain with external rotation -No weakness and pain with empty can -No pain with impingement testing -No pain with speeds/ -No pain with Bell's - Distally neurovascularly intact  Back: - No obvious deformity - Pain with palpation over the T10 spinous process - No pain with palpation of paraspinal musculature - ROM intact throughout flexion, extension, sidebending, and rotation.  Pain with sidebending right - 5/5 strength throughout LE evaluation bilaterally - No pain with ESTEPHANIA - Negative straight leg raise bilaterally - Distally neurovascularly intact  L knee: - No obvious deformity or swelling -No medial joint line pain - No pain with palpation of lateral joint line. - ROM from 135 degrees of flexion to 0 degrees extension.   - 5/5 strength with knee flexion and extension - Stable varus, valgus, Lachman's, posterior drawer testing -No pain with Alejandra's -No pain with patellar grind - Distally neurovascularly intact.     R knee: - No obvious deformity or swelling - No pain with palpation of medial or lateral joint line. - ROM from 135 degrees of flexion to 0 degrees extension. - 5/5 strength with knee flexion and extension - Stable varus, valgus, Lachman's, posterior drawer testing - Distally neurovascularly intact.
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
## 2024-09-13 ENCOUNTER — APPOINTMENT (OUTPATIENT)
Dept: ORTHOPEDIC SURGERY | Facility: CLINIC | Age: 29
End: 2024-09-13
Payer: OTHER MISCELLANEOUS

## 2024-09-13 DIAGNOSIS — S23.9XXA SPRAIN OF UNSPECIFIED PARTS OF THORAX, INITIAL ENCOUNTER: ICD-10-CM

## 2024-09-13 DIAGNOSIS — M75.51 BURSITIS OF RIGHT SHOULDER: ICD-10-CM

## 2024-09-13 DIAGNOSIS — M54.9 DORSALGIA, UNSPECIFIED: ICD-10-CM

## 2024-09-13 DIAGNOSIS — M25.562 PAIN IN LEFT KNEE: ICD-10-CM

## 2024-09-13 DIAGNOSIS — M67.911 UNSPECIFIED DISORDER OF SYNOVIUM AND TENDON, RIGHT SHOULDER: ICD-10-CM

## 2024-09-13 DIAGNOSIS — M79.18 MYALGIA, OTHER SITE: ICD-10-CM

## 2024-09-13 PROCEDURE — 99213 OFFICE O/P EST LOW 20 MIN: CPT
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
## 2024-09-13 NOTE — IMAGING
[N/A] : thoracic [No bony abnormalities] : No bony abnormalities [Right] : right shoulder [Left] : left knee [There are no fractures, subluxations or dislocations. No significant abnormalities are seen] : There are no fractures, subluxations or dislocations. No significant abnormalities are seen [de-identified] : Neck: - No obvious deformity, swelling, or bruising - No trapezius tenderness - No pain with palpation of spinous processes - ROM intact throughout flexion, extension, side bending, and rotation. No Pain with forward flexion.  - 5/5 strength throughout UE evaluation bilaterally - Negative Spurling Maneuver - Distally neurovascularly intact  L shoulder: - No obvious deformity or swelling - No pain with palpation over AC joint, anterior or posterior shoulder - 180 degrees, External rotation to 30 degrees, Internal rotation to T12 - 5/5 strength with internal and external rotation - Negative Empty can - Negative Speeds - Distally neurovascularly intact    R shoulder: - No obvious deformity or swelling - No pain with palpation over AC joint, anterior or posterior shoulder - Forward flexion to 180 degrees, External rotation to 30 degrees, Internal rotation to T12.  No pain at max range of motion throughout  - 5/5 strength with internal and external rotation.  No pain with external rotation -No weakness and pain with empty can -No pain with impingement testing -No pain with speeds/ -No pain with Bell's - Distally neurovascularly intact  Back: - No obvious deformity - No Pain with palpation over the T10 spinous process - No pain with palpation of paraspinal musculature - ROM intact throughout flexion, extension, sidebending, and rotation.  No Pain with sidebending right - 5/5 strength throughout LE evaluation bilaterally - No pain with ESTEPHANIA - Negative straight leg raise bilaterally - Distally neurovascularly intact  L knee: - No obvious deformity or swelling -No medial joint line pain - No pain with palpation of lateral joint line. - ROM from 135 degrees of flexion to 0 degrees extension.   - 5/5 strength with knee flexion and extension - Stable varus, valgus, Lachman's, posterior drawer testing -No pain with Alejandra's -No pain with patellar grind - Distally neurovascularly intact.     R knee: - No obvious deformity or swelling - No pain with palpation of medial or lateral joint line. - ROM from 135 degrees of flexion to 0 degrees extension. - 5/5 strength with knee flexion and extension - Stable varus, valgus, Lachman's, posterior drawer testing - Distally neurovascularly intact.
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
## 2024-09-13 NOTE — WORK
[Crush Injury] : crush injury [Sprain/Strain] : sprain/strain [Was the competent medical cause of the injury] : was the competent medical cause of the injury [Are consistent with the injury] : are consistent with the injury [Consistent with my objective findings] : consistent with my objective findings [Partial] : partial [Does not reveal pre-existing condition(s) that may affect treatment/prognosis] : does not reveal pre-existing condition(s) that may affect treatment/prognosis [Patient] : patient [No] : No [No Rx restrictions] : No Rx restrictions. [I provided the services listed above] :  I provided the services listed above. [Can return to work without limitations on ______] : can return to work without limitations on [unfilled] [N/A] : : Not Applicable [FreeTextEntry1] : Good [Very Heavy Work:] : Very Heavy Work: Exerting in excess of 100 pounds of force occasionally, and/or in excess of 50 pounds of force frequently, and/or in excess of 20 pounds of force constantly to move objects. Physical demand requirements are in excess of those for Heavy Work

## 2024-09-26 NOTE — ED ADULT NURSE NOTE - MODE OF DISCHARGE
"[email or web address]-effectiveness-terms. 2024© UpToDate, Inc. and its affiliates and/or licensors. All rights reserved.  Copyright   © 2024 UpToDate, Inc. and/or its affiliates. All rights reserved.     Ambulatory

## 2024-10-09 ENCOUNTER — APPOINTMENT (OUTPATIENT)
Dept: ORTHOPEDIC SURGERY | Facility: CLINIC | Age: 29
End: 2024-10-09

## 2024-12-05 ENCOUNTER — APPOINTMENT (OUTPATIENT)
Dept: ORTHOPEDIC SURGERY | Facility: CLINIC | Age: 29
End: 2024-12-05

## 2024-12-11 ENCOUNTER — APPOINTMENT (OUTPATIENT)
Dept: ORTHOPEDIC SURGERY | Facility: CLINIC | Age: 29
End: 2024-12-11
Payer: OTHER MISCELLANEOUS

## 2024-12-11 DIAGNOSIS — M23.92 UNSPECIFIED INTERNAL DERANGEMENT OF LEFT KNEE: ICD-10-CM

## 2024-12-11 DIAGNOSIS — M79.18 MYALGIA, OTHER SITE: ICD-10-CM

## 2024-12-11 DIAGNOSIS — M25.562 PAIN IN LEFT KNEE: ICD-10-CM

## 2024-12-11 PROCEDURE — 99203 OFFICE O/P NEW LOW 30 MIN: CPT

## 2024-12-17 ENCOUNTER — APPOINTMENT (OUTPATIENT)
Dept: ORTHOPEDIC SURGERY | Facility: CLINIC | Age: 29
End: 2024-12-17

## 2024-12-26 ENCOUNTER — APPOINTMENT (OUTPATIENT)
Dept: ORTHOPEDIC SURGERY | Facility: CLINIC | Age: 29
End: 2024-12-26